# Patient Record
Sex: MALE | Race: BLACK OR AFRICAN AMERICAN | NOT HISPANIC OR LATINO | URBAN - METROPOLITAN AREA
[De-identification: names, ages, dates, MRNs, and addresses within clinical notes are randomized per-mention and may not be internally consistent; named-entity substitution may affect disease eponyms.]

---

## 2024-01-29 ENCOUNTER — HOSPITAL ENCOUNTER (EMERGENCY)
Facility: MEDICAL CENTER | Age: 34
End: 2024-01-29
Attending: EMERGENCY MEDICINE

## 2024-01-29 VITALS
RESPIRATION RATE: 17 BRPM | WEIGHT: 240.3 LBS | DIASTOLIC BLOOD PRESSURE: 121 MMHG | OXYGEN SATURATION: 93 % | BODY MASS INDEX: 28.37 KG/M2 | SYSTOLIC BLOOD PRESSURE: 168 MMHG | HEART RATE: 90 BPM | TEMPERATURE: 98.6 F | HEIGHT: 77 IN

## 2024-01-29 DIAGNOSIS — I10 HYPERTENSION, UNSPECIFIED TYPE: ICD-10-CM

## 2024-01-29 DIAGNOSIS — B34.9 ACUTE VIRAL SYNDROME: ICD-10-CM

## 2024-01-29 DIAGNOSIS — R94.31 ABNORMAL EKG: ICD-10-CM

## 2024-01-29 LAB
ANION GAP SERPL CALC-SCNC: 14 MMOL/L (ref 7–16)
BASOPHILS # BLD AUTO: 0.3 % (ref 0–1.8)
BASOPHILS # BLD: 0.02 K/UL (ref 0–0.12)
BUN SERPL-MCNC: 9 MG/DL (ref 8–22)
CALCIUM SERPL-MCNC: 9.1 MG/DL (ref 8.5–10.5)
CHLORIDE SERPL-SCNC: 98 MMOL/L (ref 96–112)
CO2 SERPL-SCNC: 24 MMOL/L (ref 20–33)
CREAT SERPL-MCNC: 1.19 MG/DL (ref 0.5–1.4)
EKG IMPRESSION: NORMAL
EOSINOPHIL # BLD AUTO: 0.01 K/UL (ref 0–0.51)
EOSINOPHIL NFR BLD: 0.2 % (ref 0–6.9)
ERYTHROCYTE [DISTWIDTH] IN BLOOD BY AUTOMATED COUNT: 41.5 FL (ref 35.9–50)
GFR SERPLBLD CREATININE-BSD FMLA CKD-EPI: 82 ML/MIN/1.73 M 2
GLUCOSE SERPL-MCNC: 99 MG/DL (ref 65–99)
HCT VFR BLD AUTO: 43.3 % (ref 42–52)
HGB BLD-MCNC: 14.9 G/DL (ref 14–18)
IMM GRANULOCYTES # BLD AUTO: 0.02 K/UL (ref 0–0.11)
IMM GRANULOCYTES NFR BLD AUTO: 0.3 % (ref 0–0.9)
LYMPHOCYTES # BLD AUTO: 0.49 K/UL (ref 1–4.8)
LYMPHOCYTES NFR BLD: 7.8 % (ref 22–41)
MCH RBC QN AUTO: 30.6 PG (ref 27–33)
MCHC RBC AUTO-ENTMCNC: 34.4 G/DL (ref 32.3–36.5)
MCV RBC AUTO: 88.9 FL (ref 81.4–97.8)
MONOCYTES # BLD AUTO: 0.55 K/UL (ref 0–0.85)
MONOCYTES NFR BLD AUTO: 8.8 % (ref 0–13.4)
NEUTROPHILS # BLD AUTO: 5.17 K/UL (ref 1.82–7.42)
NEUTROPHILS NFR BLD: 82.6 % (ref 44–72)
NRBC # BLD AUTO: 0 K/UL
NRBC BLD-RTO: 0 /100 WBC (ref 0–0.2)
PLATELET # BLD AUTO: 167 K/UL (ref 164–446)
PMV BLD AUTO: 11 FL (ref 9–12.9)
POTASSIUM SERPL-SCNC: 3.5 MMOL/L (ref 3.6–5.5)
RBC # BLD AUTO: 4.87 M/UL (ref 4.7–6.1)
SODIUM SERPL-SCNC: 136 MMOL/L (ref 135–145)
TROPONIN T SERPL-MCNC: 7 NG/L (ref 6–19)
WBC # BLD AUTO: 6.3 K/UL (ref 4.8–10.8)

## 2024-01-29 PROCEDURE — 84484 ASSAY OF TROPONIN QUANT: CPT

## 2024-01-29 PROCEDURE — 36415 COLL VENOUS BLD VENIPUNCTURE: CPT

## 2024-01-29 PROCEDURE — 700102 HCHG RX REV CODE 250 W/ 637 OVERRIDE(OP): Performed by: EMERGENCY MEDICINE

## 2024-01-29 PROCEDURE — A9270 NON-COVERED ITEM OR SERVICE: HCPCS | Performed by: EMERGENCY MEDICINE

## 2024-01-29 PROCEDURE — 93005 ELECTROCARDIOGRAM TRACING: CPT | Performed by: EMERGENCY MEDICINE

## 2024-01-29 PROCEDURE — 80048 BASIC METABOLIC PNL TOTAL CA: CPT

## 2024-01-29 PROCEDURE — 93005 ELECTROCARDIOGRAM TRACING: CPT

## 2024-01-29 PROCEDURE — 99283 EMERGENCY DEPT VISIT LOW MDM: CPT

## 2024-01-29 PROCEDURE — 85025 COMPLETE CBC W/AUTO DIFF WBC: CPT

## 2024-01-29 RX ORDER — AMLODIPINE BESYLATE 10 MG/1
10 TABLET ORAL DAILY
Qty: 30 TABLET | Refills: 3 | Status: SHIPPED | OUTPATIENT
Start: 2024-01-29

## 2024-01-29 RX ORDER — AMLODIPINE BESYLATE 5 MG/1
10 TABLET ORAL ONCE
Status: COMPLETED | OUTPATIENT
Start: 2024-01-29 | End: 2024-01-29

## 2024-01-29 RX ORDER — VALSARTAN 160 MG/1
160 TABLET ORAL DAILY
Qty: 30 TABLET | Refills: 3 | Status: SHIPPED | OUTPATIENT
Start: 2024-01-29

## 2024-01-29 RX ORDER — VALSARTAN 80 MG/1
160 TABLET ORAL ONCE
Status: COMPLETED | OUTPATIENT
Start: 2024-01-29 | End: 2024-01-29

## 2024-01-29 RX ADMIN — AMLODIPINE BESYLATE 10 MG: 5 TABLET ORAL at 15:36

## 2024-01-29 RX ADMIN — VALSARTAN 160 MG: 80 TABLET ORAL at 15:37

## 2024-01-29 NOTE — ED NOTES
Pt discharged, all appropriate hospital equipment removed (IV, monitor, pulse ox, etc.). Pt left unit via walking with stable gait to ED lobby for hospital discharge via self. Personal belongings with pt when leaving unit. Pt given discharge instructions prior to leaving unit including where to  prescriptions and when to follow-up; verbalizes understanding. Pt informed to return to ED if symptoms worsen/return or altered status develop. Copy of discharge instructions signed and turned into DC basket and copy sent with pt.

## 2024-01-29 NOTE — ED NOTES
Patient states he is usually on blood pressure medication, but runs out monthly and does not get it filled as frequently as he should. Patient present hypertensive at this time. No other complaints of pain, or discomfort.

## 2024-01-29 NOTE — DISCHARGE INSTRUCTIONS
You were seen emerged part for an elevated blood pressure reading.  Thankfully your labs are reassuring.  You do have a slightly abnormal EKG, however your labs do not show any injury to your heart.  We do recommend establishing a primary care provider for follow-up.    Your medications have been refilled and sent to the [facility].    You were seen in the emergency department for an illness. Your physical exam and medical tests show you likely have a viral infection. This should improve over time. Treatment for this is to address your symptoms. This includes aggressive oral fluids, increased rest, and appropriate nutrition. You may use acetaminophen and ibuprofen for the treatment of discomfort and/or fever. Be careful that many cold remedies contain acetaminophen and you should not take more than 3000mg of acetaminophen (Tylenol) a day. You may perform warm salt water gargles every 1-2 hours as needed for sore throat. Saline (salt water) nasal sprays can help with sinus congestion.    Please return to the ED if your symptoms get worse, you develop shortness of breath, chest pain, rash or dehydration.   None known

## 2024-01-29 NOTE — ED NOTES
Celso ambulated with steady gait to room, gowned, and placed on monitors (BP, Pulse Ox). Patient is low fall risk. Standard fall risk precautions in place including bed in lowest position, side rails up, call light within reach, and area free of clutter. Stable on RA. Chart up for ERP.

## 2024-01-29 NOTE — ED PROVIDER NOTES
"ED Provider Note    Scribed for Dr. Dixon by Lauro Shoemaker. 1/29/2024,  2:09 PM.      CHIEF COMPLAINT  Chief Complaint   Patient presents with    Blood Pressure Problem     Reports high blood pressure for a month. Non-complaint to medication for more than a month. Denies chest pain or palpitation.        EXTERNAL RECORDS REVIEWED  Other patient is prior prescriptions include amlodipine 10 mg, valsartan 160 mg    HPI  LIMITATION TO HISTORY   Select: : None  OUTSIDE HISTORIAN(S):  none    Bruce Wells Jr. is a 33 y.o. male who presents to the Emergency Department for evaluation of hypertension onset 1 month ago. The patient reports he has not been taking his blood pressure medication for the last month because he has been unable to regularly refill his prescription. He states he was unable to stay asleep last night and had some body aches. He then woke up this morning with associated mildly productive cough, congestion, and a mental \"fog\". The patient reports he has taken 4 doses of ibuprofen for his symptoms with little alleviation. He denies any leg swelling. The patient states he does not take his blood pressure at home. He reports he was hospitalized in October for hypertension that was worse than it is today. The patient stays at [facility] and has been treated by nurses there for his hypertension issues. He states he typically goes to [facility] for these issues and this is his first time here. The patient reports a hx of a heart murmur, but denies any other medical issues. He states he knows he has an abnormal EKG. The patient adds he has stopped smoking tobacco.    REVIEW OF SYSTEMS  See HPI for further details. All other systems are negative.     PAST MEDICAL HISTORY  Hypertension     SURGICAL HISTORY  No past surgical history noted.    FAMILY HISTORY  No family history noted.    SOCIAL HISTORY    The patient lives at [facility]    CURRENT MEDICATIONS  No " "current outpatient medications     ALLERGIES  No Known Allergies    PHYSICAL EXAM  VITAL SIGNS: BP (!) 171/123   Pulse (!) 103   Temp 37.2 °C (99 °F) (Temporal)   Resp 14   Ht 1.956 m (6' 5\")   Wt 109 kg (240 lb 4.8 oz)   SpO2 96%   BMI 28.50 kg/m²   Gen: Alert  HENT: ATNC  Eyes: Normal conjunctiva  Neck: trachea midline  Resp: no respiratory distress, CTAB  CV: No JVD, RRR, no m/r/g. Equal radial pulses  Abd: non-distended, non-tender  Ext: No deformities, no edema  Psych: normal mood  Neuro: speech fluent, GCS of 10, moves all extremities    DIAGNOSTIC STUDIES / PROCEDURES  EKG  I independently interpreted this EKG.  Results for orders placed or performed during the hospital encounter of 24   EKG   Result Value Ref Range    Report       [facility] Emergency Dept.    Test Date:  2024  Pt Name:    NAZIA JARAMILLO       Department: ER  MRN:        [id number]                      Room:       [facility]  Gender:     Male                         Technician: [id number]  :        1990                   Requested By:ER TRIAGE PROTOCOL  Order #:    [id number]                    Reading MD: Cameron Dixon    Measurements  Intervals                                Axis  Rate:       94                           P:          59  NY:         195                          QRS:        46  QRSD:       99                           T:          260  QT:         333  QTc:        417    Interpretive Statements  Sinus rhythm  LAE, consider biatrial enlargement  Abnormal T diffuse leads  Borderline ST elevation, anterior leads  Baseline wander in lead(s) V4  No previous ECG available for comparison  Electronically Signed On 2024 14:08:04 PST by Cameron Dixon         LABS  Labs Reviewed   CBC WITH DIFFERENTIAL - Abnormal; Notable for the following components:       Result Value    Neutrophils-Polys 82.60 (*)     Lymphocytes 7.80 (*)     Lymphs (Absolute) 0.49 (*)     All other components within normal " limits    Narrative:     Biotin intake of greater than 5 mg per day may interfere with  troponin levels, causing false low values.   BASIC METABOLIC PANEL - Abnormal; Notable for the following components:    Potassium 3.5 (*)     All other components within normal limits    Narrative:     Biotin intake of greater than 5 mg per day may interfere with  troponin levels, causing false low values.   TROPONIN    Narrative:     Biotin intake of greater than 5 mg per day may interfere with  troponin levels, causing false low values.   ESTIMATED GFR    Narrative:     Biotin intake of greater than 5 mg per day may interfere with  troponin levels, causing false low values.       COURSE & MEDICAL DECISION MAKING  Pertinent Labs & Imaging studies were reviewed. (See chart for details)    ED Observation Status? Yes  Patient admitted to ED observation at 2:09 PM on 1/29/2024    Observation plan: Monitor for symptom management and diagnostic results.     After observation the patient is improved and will be discharged  Patient discharged from ED Observation at 3:11 PM on 1/29/2024   -----------    2:09 PM Patient seen and examined at bedside.     3:11 PM - I reevaluated the patient at bedside. The patient informs me they feel improved following Diovan and Norvasc administration. I discussed the patient's diagnostic study results. I discussed plan for discharge and follow up as outlined below. The patient is stable for discharge at this time and will return for any new or worsening symptoms. Patient verbalizes understanding and support with my plan for discharge.      INITIAL ASSESSMENT AND PLAN  Medical Decision Making: Patient presents with poorly controlled hypertension.  EKG done at triage demonstrate abnormalities.  Clinically patient demonstrates no signs of hypertensive encephalopathy.  Metabolic panel demonstrates borderline hypokalemia but no evidence of acute kidney injury.  Troponin not elevated.  No evidence of ACS,  myocarditis.  Bedside ultrasound performed demonstrates no significant abnormalities such as pericardial effusion, or right ventricular dysfunction.  The patient was restarted on his home blood pressure medications, will refer to establish primary care provider.    Regarding the patient feeling unwell today, he likely has picked up a viral syndrome.  He was offered viral testing but declines.  Clinically no evidence of pneumonia, strep throat, intra-abdominal infection.    ADDITIONAL PROBLEM LIST AND DISPOSITION  Point of Care Ultrasound    ED POINT OF CARE ULTRASOUND: LIMITED CARDIAC    Indication for exam: Abnormal EKG  LVEF: normal  Pericardial Effusion:not present  RV Strain:not present  Pulmonary B Lines:not present    Image retained through [facility] as seen below:                   This study is a limited ultrasound examination performed and interpreted to evaluate for limited conditions as outlined above. There may be other clinically important information contained in the images that is outside this scope. When clinically warranted, a comprehensive ultrasound through the appropriate department is considered.      I have discussed management of the patient with the following medical professionals:  None    Escalation of care considered, and ultimately not performed: diagnostic imaging.     Barriers to care at this time, including but not limited to: Patient does not have established PCP, Patient is homeless, and Patient does not have insurance.     Decision tools and prescription drugs considered including, but not limited to: Medication modification see below .      The patient will return for new or worsening symptoms and is stable at the time of discharge. The patient was given an opportunity to ask questions.    The patient is referred to a primary physician for blood pressure management, diabetic screening, and for all other preventative health concerns.    DISPOSITION:  Patient will be discharged home in  stable condition.    FOLLOW UP:  [facility], Emergency Dept  [address] [address] [phone]    If symptoms worsen    To establish a primary care provider within our system, please call [phone]          [facility] - Behavioral Health Counseling  [address] [facility] [address]  [phone]        [facility] ([facility]) - Primary Care and Family Medicine  [address] [facility] [address]  [phone]          OUTPATIENT MEDICATIONS:  Discharge Medication List as of 1/29/2024  3:44 PM        START taking these medications    Details   valsartan (DIOVAN) 160 MG Tab Take 1 Tablet by mouth every day., Disp-30 Tablet, R-3, Normal      amLODIPine (NORVASC) 10 MG Tab Take 1 Tablet by mouth every day., Disp-30 Tablet, R-3, Normal                FINAL IMPRESSION  1. Hypertension, unspecified type    2. Abnormal EKG    3. Acute viral syndrome             ILauro (Gloria), am scribing for, and in the presence of, Cameron Dixon M.D..    Electronically signed by: Lauro Shoemaker (Melizaibe), 1/29/2024    I, Cameron Dixon M.D. personally performed the services described in this documentation, as scribed by Lauro Shoemaker in my presence, and it is both accurate and complete.    The note accurately reflects work and decisions made by me.  Cameron Dixon M.D.  1/29/2024  7:45 PM      This dictation was created using voice recognition software. The accuracy of the dictation is limited to the abilities of the software. I expect there may be some errors of grammar and possibly content. The nursing notes were reviewed and certain aspects of this information were incorporated into this note.

## 2024-01-29 NOTE — ED TRIAGE NOTES
Bruce Wells Jr.  33 y.o. male  Chief Complaint   Patient presents with    Blood Pressure Problem     Reports high blood pressure for a month. Non-complaint to medication for more than a month. Denies chest pain or palpitation.      Pt ambulatory to triage with steady gait for above complaint.     Pt is GCS 15, speaking in full sentences, follows commands and responds appropriately to questions. Resp are even and unlabored. Patient denies pain.      Pt placed in ED lobby. Pt educated on triage process. Pt encouraged to alert staff for any changes.       Vitals:    01/29/24 1327   BP: (!) 171/123   Pulse:    Resp:    Temp:    SpO2:

## 2024-02-26 ENCOUNTER — TELEPHONE (OUTPATIENT)
Dept: SCHEDULING | Facility: IMAGING CENTER | Age: 34
End: 2024-02-26

## 2024-05-07 ENCOUNTER — PHARMACY VISIT (OUTPATIENT)
Dept: PHARMACY | Facility: MEDICAL CENTER | Age: 34
End: 2024-05-07
Payer: COMMERCIAL

## 2024-05-07 ENCOUNTER — HOSPITAL ENCOUNTER (EMERGENCY)
Facility: MEDICAL CENTER | Age: 34
End: 2024-05-07
Attending: EMERGENCY MEDICINE
Payer: OTHER MISCELLANEOUS

## 2024-05-07 ENCOUNTER — APPOINTMENT (OUTPATIENT)
Dept: RADIOLOGY | Facility: MEDICAL CENTER | Age: 34
End: 2024-05-07
Attending: EMERGENCY MEDICINE
Payer: OTHER MISCELLANEOUS

## 2024-05-07 VITALS
BODY MASS INDEX: 31.86 KG/M2 | WEIGHT: 269.84 LBS | SYSTOLIC BLOOD PRESSURE: 176 MMHG | RESPIRATION RATE: 16 BRPM | OXYGEN SATURATION: 96 % | DIASTOLIC BLOOD PRESSURE: 124 MMHG | HEART RATE: 70 BPM | TEMPERATURE: 97.8 F | HEIGHT: 77 IN

## 2024-05-07 DIAGNOSIS — S20.212A CONTUSION OF LEFT CHEST WALL, INITIAL ENCOUNTER: ICD-10-CM

## 2024-05-07 DIAGNOSIS — I10 HYPERTENSION, UNSPECIFIED TYPE: ICD-10-CM

## 2024-05-07 PROCEDURE — A9270 NON-COVERED ITEM OR SERVICE: HCPCS | Performed by: EMERGENCY MEDICINE

## 2024-05-07 PROCEDURE — 71045 X-RAY EXAM CHEST 1 VIEW: CPT

## 2024-05-07 PROCEDURE — RXMED WILLOW AMBULATORY MEDICATION CHARGE: Performed by: EMERGENCY MEDICINE

## 2024-05-07 PROCEDURE — 700102 HCHG RX REV CODE 250 W/ 637 OVERRIDE(OP): Performed by: EMERGENCY MEDICINE

## 2024-05-07 PROCEDURE — 99284 EMERGENCY DEPT VISIT MOD MDM: CPT

## 2024-05-07 RX ORDER — AMLODIPINE BESYLATE 10 MG/1
10 TABLET ORAL DAILY
Qty: 30 TABLET | Refills: 3 | Status: SHIPPED | OUTPATIENT
Start: 2024-05-07

## 2024-05-07 RX ORDER — VALSARTAN 160 MG/1
160 TABLET ORAL DAILY
Qty: 30 TABLET | Refills: 3 | Status: SHIPPED | OUTPATIENT
Start: 2024-05-07

## 2024-05-07 RX ORDER — AMLODIPINE BESYLATE 5 MG/1
10 TABLET ORAL ONCE
Status: COMPLETED | OUTPATIENT
Start: 2024-05-07 | End: 2024-05-07

## 2024-05-07 RX ORDER — VALSARTAN 80 MG/1
160 TABLET ORAL ONCE
Status: COMPLETED | OUTPATIENT
Start: 2024-05-07 | End: 2024-05-07

## 2024-05-07 RX ADMIN — AMLODIPINE BESYLATE 10 MG: 5 TABLET ORAL at 14:33

## 2024-05-07 RX ADMIN — VALSARTAN 160 MG: 80 TABLET ORAL at 15:14
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
## 2024-05-07 NOTE — ED NOTES
"Pt discharged home. Pt in possession of belongings. Pt provided discharge education and information pertaining to medications and follow up appointments. Pt received copy of discharge instructions and verbalized understanding. BP (!) 176/124   Pulse 70   Temp 36.6 °C (97.8 °F) (Temporal)   Resp 16   Ht 1.956 m (6' 5\")   Wt 122 kg (269 lb 13.5 oz)   SpO2 96%   BMI 32.00 kg/m²     "
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
